# Patient Record
(demographics unavailable — no encounter records)

---

## 2025-03-10 NOTE — ASSESSMENT
[FreeTextEntry1] : Interim history Patient returns the office now having completed 2 facet joint injections of the right L3-4 L4-5 L5-S1 levels.  The pain before each of these procedures where the tendon after the procedures was 1-2 out of 10.  Patient had greater than 80% reduction of the pain for more than 2 months.  The patient's pain has now returned and she would like to proceed with radiofrequency ablation of those joints.  The patient denies any bowel or bladder incontinence or any progressive weakness. Objective There are no new imaging studies available.  Patient is observed with normal gait and states that there is no significant change in her physical status. Assessment Lumbar facet arthropathy Plan Due to the response to the facet joint injection the patient is a candidate for a radiofrequency ablation of the right L3-4 L4-5 L5-S1 levels.  Risk and benefits of the procedure explained in detail to the patient and patient is willing to proceed.  The patient will be scheduled at her earliest convenience.

## 2025-03-10 NOTE — HISTORY OF PRESENT ILLNESS
[Lower back] : lower back [5] : 5 [Dull/Aching] : dull/aching [Frequent] : frequent [Meds] : meds [Ice] : ice [Injection therapy] : injection therapy [Walking] : walking [Retired] : Work status: retired [Home] : at home, [unfilled] , at the time of the visit. [Medical Office: ([facility])___] : at the medical office located in  [Telehealth (audio & video)] : This visit was provided via telehealth using real-time 2-way audio visual technology. [Verbal consent obtained from patient] : the patient, [unfilled] [] : no [FreeTextEntry9] : TENS UNIT [de-identified] : ACTIVITY [de-identified] : 01.2025 [de-identified] : 01.13.25 [TWNoteComboBox1] : 60%

## 2025-03-25 NOTE — ASSESSMENT
[FreeTextEntry1] : Alert and oriented X 3. Cervical ROM decreased. Tenderness upon palpation to bilateral trapezius and levator scapula regions. Skin integrity intact.  Carac Counseling:  I discussed with the patient the risks of Carac including but not limited to erythema, scaling, itching, weeping, crusting, and pain.

## 2025-03-25 NOTE — HISTORY OF PRESENT ILLNESS
[Lower back] : lower back [6] : 6 [5] : 5 [Dull/Aching] : dull/aching [Frequent] : frequent [Meds] : meds [Ice] : ice [Injection therapy] : injection therapy [Walking] : walking [Retired] : Work status: retired [FreeTextEntry1] : States her neck muscle spasms have been bothersome. States in the past she responded well with TPI's from Dr Chavez monthly. She wishes to try again. States she stretches often and uses heat and ice prn.  [] : no [FreeTextEntry9] : TENS UNIT [de-identified] : ACTIVITY [de-identified] : 01.2025 [de-identified] : 01.13.25 [TWNoteComboBox1] : 60%

## 2025-03-25 NOTE — DISCUSSION/SUMMARY
[de-identified] : No  controlled substances written today.  reviewed and appropriate.  The bilateral cervical trapezius and levator scapula areas were  palpated for tenderness and observed for skin integrity. Area was cleansed and prepped with alcohol.  A total of ten trigger point injections were administered using a 25 gauge 1 1/2 inch needle after negative aspiration using Bupivicaine 0.25%, 1cc each, for a total of 10 cc's. Patient tolerated well. Follow up prn.

## 2025-04-29 NOTE — DISCUSSION/SUMMARY
[Medication Risks Reviewed] : Medication risks reviewed [de-identified] :  No controlled substances written on todays visit. Opioid agreement/obtained on chart [place]  reviewed and appropriate.  The bilateral trapezius and levator scapula areas were  palpated for tenderness and observed for skin integrity. Area was cleansed and prepped with alcohol.  A total of eight  trigger point injections were administered using a 25 gauge 1 1/2 inch needle after negative aspiration using Bupivicaine 0.25%,  for a total of ten cc's. Patient tolerated well.  Follow up prn.

## 2025-04-29 NOTE — HISTORY OF PRESENT ILLNESS
[Lower back] : lower back [8] : 8 [6] : 6 [Dull/Aching] : dull/aching [Frequent] : frequent [Meds] : meds [Ice] : ice [Injection therapy] : injection therapy [Walking] : walking [Retired] : Work status: retired [FreeTextEntry1] : States cervical muscle spasms vary. Trigger point injections effective prn.  [] : no [FreeTextEntry9] : TENS UNIT [de-identified] : ACTIVITY [de-identified] : 01.2025 [de-identified] : 01.13.25 [TWNoteComboBox1] : 60%